# Patient Record
Sex: MALE | URBAN - METROPOLITAN AREA
[De-identification: names, ages, dates, MRNs, and addresses within clinical notes are randomized per-mention and may not be internally consistent; named-entity substitution may affect disease eponyms.]

---

## 2023-01-10 ENCOUNTER — TELEPHONE (OUTPATIENT)
Dept: FAMILY MEDICINE | Facility: CLINIC | Age: 38
End: 2023-01-10

## 2023-01-10 DIAGNOSIS — I51.9 HEART DISEASE: Primary | ICD-10-CM

## 2023-01-10 NOTE — TELEPHONE ENCOUNTER
[facility] calling needing-most recent clinic note.  Caller faxed this to [phone].  Also anything relevant.  Lillian Mariee,

## 2023-01-10 NOTE — TELEPHONE ENCOUNTER
Lito contacted me. He has no insurance and cannot afford an office visit. He requested referral to cardiology who can see him for no cost. He is [place] and has no previous records. But he was told he has some type of heart disease that requires urgent attention.    Please fax the referral I placed to:    [facility], Dr. Barba [phone].    Please route back to me once it is faxed.    Nain Us M.D.

## 2023-04-08 ENCOUNTER — TRANSFERRED RECORDS (OUTPATIENT)
Dept: HEALTH INFORMATION MANAGEMENT | Facility: CLINIC | Age: 38
End: 2023-04-08

## 2024-03-23 ENCOUNTER — TRANSFERRED RECORDS (OUTPATIENT)
Dept: HEALTH INFORMATION MANAGEMENT | Facility: CLINIC | Age: 39
End: 2024-03-23

## 2024-05-25 ENCOUNTER — TRANSFERRED RECORDS (OUTPATIENT)
Dept: HEALTH INFORMATION MANAGEMENT | Facility: CLINIC | Age: 39
End: 2024-05-25

## 2024-05-25 LAB — EJECTION FRACTION: NORMAL %
